# Patient Record
Sex: MALE
[De-identification: names, ages, dates, MRNs, and addresses within clinical notes are randomized per-mention and may not be internally consistent; named-entity substitution may affect disease eponyms.]

---

## 2021-01-28 ENCOUNTER — APPOINTMENT (OUTPATIENT)
Dept: ORTHOPEDIC SURGERY | Facility: CLINIC | Age: 61
End: 2021-01-28
Payer: COMMERCIAL

## 2021-01-28 VITALS
WEIGHT: 206 LBS | BODY MASS INDEX: 30.51 KG/M2 | SYSTOLIC BLOOD PRESSURE: 130 MMHG | HEART RATE: 86 BPM | DIASTOLIC BLOOD PRESSURE: 81 MMHG | HEIGHT: 69 IN

## 2021-01-28 DIAGNOSIS — Z78.9 OTHER SPECIFIED HEALTH STATUS: ICD-10-CM

## 2021-01-28 DIAGNOSIS — Z72.89 OTHER PROBLEMS RELATED TO LIFESTYLE: ICD-10-CM

## 2021-01-28 PROCEDURE — 73030 X-RAY EXAM OF SHOULDER: CPT | Mod: LT

## 2021-01-28 PROCEDURE — 20610 DRAIN/INJ JOINT/BURSA W/O US: CPT | Mod: LT

## 2021-01-28 PROCEDURE — 99204 OFFICE O/P NEW MOD 45 MIN: CPT | Mod: 25

## 2021-01-28 PROCEDURE — 99072 ADDL SUPL MATRL&STAF TM PHE: CPT

## 2021-01-28 RX ORDER — MELOXICAM 7.5 MG/1
7.5 TABLET ORAL
Qty: 30 | Refills: 0 | Status: ACTIVE | COMMUNITY
Start: 2021-01-28 | End: 1900-01-01

## 2021-02-03 NOTE — HISTORY OF PRESENT ILLNESS
[5] : a current pain level of 5/10 [1] : the ailment interference is 1/10 [(Does not interfere) 0] : the ailment interference is 0/10 (does not interfere) [4] : the ailment interference is 4/10 [de-identified] : Reese comes in today with complaints of pain to his left shoulder.  He has been training and practicing, getting ready for his golf season and he is having increasing complaints of pain to his shoulder.  This injury is not work related or due to an automobile accident.  The patient states the pain is localized.  The patient describes the pain as sharp.  The patient states lying down makes the symptoms worse.  [] : No

## 2021-02-03 NOTE — PROCEDURE
[de-identified] : Consent: \par At this time, I have recommended an injection to the left shoulder. The risks and benefits of the procedure were discussed with the patient in detail.  Upon verbal consent of the patient, we proceeded with the injection as noted below.  \par \par Procedure:  \par After a sterile prep, the patient underwent an injection of 9 cc of 1% Lidocaine without epinephrine and 1 cc of Kenalog into the left shoulder.  The patient tolerated the procedure well.  There were no complications.  \par \par

## 2021-02-03 NOTE — CONSULT LETTER
[Dear  ___] : Dear  [unfilled], [Consult Letter:] : I had the pleasure of evaluating your patient, [unfilled]. [Please see my note below.] : Please see my note below. [Consult Closing:] : Thank you very much for allowing me to participate in the care of this patient.  If you have any questions, please do not hesitate to contact me. [Sincerely,] : Sincerely, [FreeTextEntry3] : Miguel Collins III, MD \par NAHUM/dianne

## 2021-02-03 NOTE — DISCUSSION/SUMMARY
[de-identified] : At this time I recommend ice and elevation for the calcific tendinitis, left shoulder.  He will be reassessed in three or four weeks.

## 2021-02-03 NOTE — PHYSICAL EXAM
[de-identified] : Right Shoulder:\par Shoulder: Range of Motion in Degrees:   	\par    	                        Claimant:          Normal:  	\par Abduction (Active)  	180  	180 degrees  	\par Abduction (Passive)  	180  	180 degrees  	\par Forward elevation (Active):  	180  	180 degrees  	\par Forward elevation (Passive):  180  	180 degrees  	\par External rotation (Active):  	45  	45 degrees  	\par External rotation (Passive):  	45  	45 degrees  	\par Internal rotation (Active):  	L-1  	L-1  	\par Internal rotation (Passive):  	L-1  	L-1  	\par \par No motor weakness to internal rotation, external rotation or abduction in the scapular plane.  Negative crank test.  Negative O’Beau’s test.  Negative Speed’s test. Negative Yergason’s test.  Negative cross arm test.  No tenderness to palpation at the AC joint. Negative Hawkin’s sign.  Negative Neer’s sign.  Negative apprehension. Negative sulcus sign.  No gross neurological or vascular deficits distally.  Skin is intact.  No rashes, scars or lesions.  2+ radial and ulnar pulses. No extra-articular swelling or tenderness. \par \par Left Shoulder:  \par Shoulder: Range of Motion in Degrees:	\par 	                                  Claimant:	Normal:	\par Abduction (Active)	                  180	               180 degrees	\par Abduction (Passive)	  180	               180 degrees	\par Forward elevation (Active):	  180	               180 degrees	\par Forward elevation (Passive):	  180	               180 degrees	\par External rotation (Active):	   45	               45 degrees	\par External rotation (Passive):	   45	               45 degrees	\par Internal rotation (Active):	   L-1	               L-1	\par Internal rotation (Passive):	   L-1	               L-1	\par  \par No motor weakness to internal rotation, external rotation or abduction in the scapular plane.  Negative crank test.  Negative O’Beau’s test.  Negative Speed’s test. Negative Yergason’s test.  Negative cross arm test.  No tenderness to palpation at the AC joint. Positive Hawkin’s sign.  Positive Neer’s sign. Negative apprehension. Negative sulcus sign.  No gross neurological or vascular deficits distally.  Skin is intact.  No rashes, scars or lesions. 2+ radial and ulnar pulses. No extra-articular swelling or tenderness.\par   [de-identified] : Gait: normal    Station: normal  [de-identified] : Appearance: Well-developed, well-nourished male  in no acute distress.  [de-identified] : Radiographs, two views of the left shoulder, show calcific tendinitis.

## 2021-02-03 NOTE — ADDENDUM
[FreeTextEntry1] : This note was written by Natalie Nelson on 02/02/2021 acting as scribe for Miguel Collins III, MD

## 2021-02-18 ENCOUNTER — APPOINTMENT (OUTPATIENT)
Dept: ORTHOPEDIC SURGERY | Facility: CLINIC | Age: 61
End: 2021-02-18
Payer: COMMERCIAL

## 2021-02-18 DIAGNOSIS — M75.32 CALCIFIC TENDINITIS OF LEFT SHOULDER: ICD-10-CM

## 2021-02-18 PROCEDURE — 99441: CPT

## 2021-03-01 PROBLEM — M75.32 CALCIFIC TENDINITIS OF LEFT SHOULDER: Status: ACTIVE | Noted: 2021-01-28

## 2022-02-26 ENCOUNTER — NON-APPOINTMENT (OUTPATIENT)
Age: 62
End: 2022-02-26

## 2022-02-28 ENCOUNTER — APPOINTMENT (OUTPATIENT)
Dept: ORTHOPEDIC SURGERY | Facility: CLINIC | Age: 62
End: 2022-02-28
Payer: COMMERCIAL

## 2022-02-28 PROCEDURE — 99214 OFFICE O/P EST MOD 30 MIN: CPT

## 2022-02-28 PROCEDURE — 73630 X-RAY EXAM OF FOOT: CPT | Mod: LT

## 2022-02-28 NOTE — HISTORY OF PRESENT ILLNESS
[FreeTextEntry1] : The patient is a 61 year old male presenting for an initial evaluation of left great toe pain. The patient reports intermittent spontaneous pain to his great toe. He denies any pain to palpation today in office. He denies any pain while wearing un-supportive shoes, however, he does report pain with supportive shoes. He reports that pain is worsened with ambulating on uneven ground, while wearing cushioned shoes. He denies any past history of Gout or any significant medical history. He does have a familial history of Hallux Rigidus, with his daughter undergoing surgery in [place], [place]. He is wearing regular sneakers and is walking without assistance. No other complaints. \par  \par \par

## 2022-02-28 NOTE — ADDENDUM
[FreeTextEntry1] : I, Linda Christensen, acted solely as a scribe for Dr. Foreign Millard on this date 02/28/2022.\par \par All medical record entries made by the Scribe were at my, Dr. Foreign Millard, direction and personally dictated by me on 02/28/2022 . I have reviewed the chart and agree that the record accurately reflects my personal performance of the history, physical exam, assessment and plan. I have also personally directed, reviewed, and agreed with the chart.	\par

## 2022-02-28 NOTE — PHYSICAL EXAM
[de-identified] : General: Alert and oriented x3. In no acute distress. Pleasant in nature with a normal affect. No apparent respiratory distress.\par \par Left Foot Exam\par Skin: Clean, dry, intact\par Inspection: No obvious malalignment, no masses, no swelling, no effusion\par Pulses: 2+ DP/PT pulses\par ROM: FOOT Full  ROM of digits, ANKLE 10 degrees of dorsiflexion, 40 degrees of plantarflexion, 10 degrees of subtalar motion.\par Painful ROM: None\par Tenderness: + Tenderness to the dorsal joint. No pain with axial loading. No tenderness over the medial malleolus, No tenderness over the lateral malleolus, no CFL/ATFL/PTFL pain, no deltoid ligament pain. No heel pain. No Achilles tenderness. No 5th metatarsal pain. No pain to the LisFranc joint. No ttp over the posterior tibial tendon.\par Stability: Negative anterior/posterior drawer.\par Strength: 5/5 ADD/ABD/TA/GS/EHL/FHL/EDL\par Neuro: Sensation in tact to light touch throughout\par Additional tests: Negative Mortons test, negative tarsal tunnel tinels, negative single heel rise.	\par \par L Big Toe Exam\par ROM Great toe: 30 degrees of dorsiflexion with no pain, 30 degrees of plantarflexion with pain.		 [de-identified] : 3V of the left foot were ordered, obtained, and reviewed by me today, 02/28/2022, revealed: Small osteophytes at the 1st MTPJ. Hallux Rigidus. \par

## 2022-02-28 NOTE — DISCUSSION/SUMMARY
[de-identified] : Today I had a lengthy discussion with the patient regarding their left foot, Hallux Rigidus. I have addressed all the patient's concerns surrounding the pathology of their condition. XR imaging was completed in office today and results were reviewed with the patient. At this time I would like to obtain advanced imaging of the patient's left foot. An MRI was ordered so I can find out more about the etiology of the patient's condition and to evaluate the cartilage at the first MTPJ. The patient should follow up with the office after obtaining the MRI.	\par \par I recommend that the patient utilize meloxicam with food once per day as instructed. A prescription for the meloxicam was ordered for the patient in the office today. I recommend that the patient utilize ice, NSAIDs, and heat PRN. They can also elevate their left foot above the level of the heart. The patient understood and verbally agreed to the treatment plan. All of their questions were answered and they were satisfied with the visit. The patient should call the office if they have any questions or experience worsening symptoms.

## 2022-03-10 ENCOUNTER — APPOINTMENT (OUTPATIENT)
Dept: MRI IMAGING | Facility: CLINIC | Age: 62
End: 2022-03-10

## 2022-03-13 ENCOUNTER — TRANSCRIPTION ENCOUNTER (OUTPATIENT)
Age: 62
End: 2022-03-13

## 2022-04-07 ENCOUNTER — APPOINTMENT (OUTPATIENT)
Age: 62
End: 2022-04-07
Payer: COMMERCIAL

## 2022-04-07 PROCEDURE — 99214 OFFICE O/P EST MOD 30 MIN: CPT

## 2022-04-07 NOTE — PHYSICAL EXAM
[de-identified] : General: Alert and oriented x3. In no acute distress. Pleasant in nature with a normal affect. No apparent respiratory distress.\par \par Left Foot Exam\par Skin: Clean, dry, intact\par Inspection: No obvious malalignment, no masses, no swelling, no effusion\par Pulses: 2+ DP/PT pulses\par ROM: FOOT Full  ROM of digits, ANKLE 10 degrees of dorsiflexion, 40 degrees of plantarflexion, 10 degrees of subtalar motion.\par Painful ROM: None\par Tenderness: + Tenderness to the dorsal joint. No pain with axial loading. No tenderness over the medial malleolus, No tenderness over the lateral malleolus, no CFL/ATFL/PTFL pain, no deltoid ligament pain. No heel pain. No Achilles tenderness. No 5th metatarsal pain. No pain to the LisFranc joint. No ttp over the posterior tibial tendon.\par Stability: Negative anterior/posterior drawer.\par Strength: 5/5 ADD/ABD/TA/GS/EHL/FHL/EDL\par Neuro: Sensation in tact to light touch throughout\par Additional tests: Negative Mortons test, negative tarsal tunnel tinels, negative single heel rise.	\par \par L Big Toe Exam\par ROM Great toe: 30 degrees of dorsiflexion with no pain, 30 degrees of plantarflexion with pain.		 [de-identified] : MRI of the left foot done on 3/22/2022 at [facility] results reviewed 04/07/2022 , results as reported in the chart:\par \par Impressions:\par 1. 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 \par \par The images were unavailable. Report was reviewed with the patient and scanned into the chart.

## 2022-04-07 NOTE — HISTORY OF PRESENT ILLNESS
[FreeTextEntry1] : 4/7/2022: The patient returns to the office to review MRI results of the left foot. The patient reports that he utilized Meloxicam while on a 4 day golf trip with relief in symptoms noted. He does attend physical therapy and a HEP for his left foot. He is wearing dress shoes and is walking without assistance. No other complaints. \par \par 2/28/2022: The patient is a 61 year old male presenting for an initial evaluation of left great toe pain. The patient reports intermittent spontaneous pain to his great toe. He denies any pain to palpation today in office. He denies any pain while wearing un-supportive shoes, however, he does report pain with supportive shoes. He reports that pain is worsened with ambulating on uneven ground, while wearing cushioned shoes. He denies any past history of Gout or any significant medical history. He does have a familial history of Hallux Rigidus, with his daughter undergoing surgery in [place], [place]. He is wearing regular sneakers and is walking without assistance. No other complaints. \par  \par \par

## 2022-04-07 NOTE — DISCUSSION/SUMMARY
[de-identified] : Today I had a lengthy discussion with the patient regarding their left great toe pain, Hallux Rigidus. I have addressed all the patient's concerns surrounding the pathology of their condition. MRI results were reviewed with the patient today in the office. I recommend the patient undergo a course of physical therapy for the left great toe  2-3 times a week for a total of 6-8 weeks. A prescription was given for the physical therapy today. I recommend that the patient utilize a Gordon extension plate. The patient was educated about the Gordon extension plate in the office today.	\par \par Further, we discussed that if the patient would like to discuss surgical interventions, I advised him to bring the MRI CD with the images for further evaluation. The patient understood and verbally agreed to the treatment plan. All of their questions were answered and they were satisfied with the visit. The patient should call the office if they have any questions or experience worsening symptoms. I would like to see the patient back in the office in 2-3 months PRN to reassess their condition. 				\par

## 2022-04-07 NOTE — ADDENDUM
[FreeTextEntry1] : I, Linda Christensen, acted solely as a scribe for Dr. Foreign Millard on this date 04/07/2022.\par \par All medical record entries made by the Scribe were at my, Dr. Foreign Millard, direction and personally dictated by me on 04/07/2022 . I have reviewed the chart and agree that the record accurately reflects my personal performance of the history, physical exam, assessment and plan. I have also personally directed, reviewed, and agreed with the chart.	\par

## 2022-08-24 ENCOUNTER — NON-APPOINTMENT (OUTPATIENT)
Age: 62
End: 2022-08-24

## 2022-08-25 ENCOUNTER — APPOINTMENT (OUTPATIENT)
Dept: ORTHOPEDIC SURGERY | Facility: CLINIC | Age: 62
End: 2022-08-25

## 2022-08-25 PROCEDURE — 99213 OFFICE O/P EST LOW 20 MIN: CPT

## 2022-08-29 NOTE — DISCUSSION/SUMMARY
[de-identified] : At this time, due to SC arthritis and possible cervical spine complaints, I recommended rest, ice and topical anti-inflammatory cream for the SC joint.  He will be reassessed in 2-3 weeks.  Should his symptoms warrant, he may get an MRI and possible injection.  As far as the paraspinal issues, he is being referred to Dr. Lezama.

## 2022-08-29 NOTE — ADDENDUM
[FreeTextEntry1] : This note was written by Jonas Huerta on 08/29/2022, acting as a scribe for Miguel Collins III, MD

## 2022-08-29 NOTE — HISTORY OF PRESENT ILLNESS
[de-identified] : The patient comes in today for his right shoulder.  He states his left shoulder is feeling good, but he is having some pain in his right shoulder.  He states it comes and goes, especially when he is sitting at his desk.  He points to his SC joint, the right shoulder and up into the paraspinal musculature.  \par

## 2022-08-29 NOTE — PHYSICAL EXAM
[Normal] : Gait: normal [de-identified] : Left Shoulder: \par Shoulder: Range of Motion in Degrees:   	\par    	                        Claimant:  	Normal:  	\par Abduction (Active)  	180  	180 degrees  	\par Abduction (Passive)  	180  	180 degrees  	\par Forward elevation (Active):  	180  	180 degrees  	\par Forward elevation (Passive):  180  	180 degrees  	\par External rotation (Active):  	45  	45 degrees  	\par External rotation (Passive):  	45  	45 degrees  	\par Internal rotation (Active):  	L-1  	L-1  	\par Internal rotation (Passive):  	L-1  	L-1  	\par \par No motor weakness to internal rotation, external rotation or abduction in the scapular plane.  Negative crank test.  Negative O’Beau’s test.  Negative Speed’s test. Negative Yergason’s test.  Negative cross arm test.  No tenderness to palpation at the AC joint. Negative Hawkin’s sign.  Negative Neer’s sign.  Negative apprehension. Negative sulcus sign.  No gross neurological or vascular deficits distally.  Skin is intact.  No rashes, scars or lesions.  2+ radial and ulnar pulses. No extra-articular swelling or tenderness.\par  \par Right Shoulder: \par Shoulder: Range of Motion in Degrees:  	\par 	                        Claimant:	 Normal:	\par Abduction (Active)	                180	180 degrees	\par Abduction (Passive)	180	180 degrees	\par Forward elevation (Active):	180	180 degrees	\par Forward elevation (Passive):	180	180 degrees	\par External rotation (Active):	45	45 degrees	\par External rotation (Passive):	45	45 degrees	\par Internal rotation (Active):	L-1	L-1	\par Internal rotation (Passive):	L-1	L-1	\par \par Tenderness at the SC joint.  No motor weakness to internal rotation, external rotation or abduction in the scapular plane.  Negative crank test.  Negative O’Beau’s test.  Positive Speed’s test - Minimal.  Positive Yergason’s test - Minimal.  Negative cross arm test.  No tenderness to palpation at the AC joint. Negative Hawkin’s sign.  Negative Neer’s sign. Negative apprehension. Negative sulcus sign.  No gross neurological or vascular deficits distally.  Skin is intact.  No rashes, scars or lesions. 2+ radial and ulnar pulses. No extra-articular swelling or tenderness.\par   [de-identified] : Appearance:  Well developed, well-nourished male in no acute distress.\par

## 2022-08-29 NOTE — CONSULT LETTER
[Dear  ___] : Dear  [unfilled], [Referral Letter:] : I am referring [unfilled] to you for further evaluation.  My most recent evaluation follows. [Referral Closing:] : Thank you very much for seeing this patient.  If you have any questions, please do not hesitate to contact me. [Sincerely,] : Sincerely, [FreeTextEntry3] : Miguel Collins III, MD \par NAHUM/nelson\par

## 2022-09-14 ENCOUNTER — APPOINTMENT (OUTPATIENT)
Dept: ORTHOPEDIC SURGERY | Facility: CLINIC | Age: 62
End: 2022-09-14

## 2022-09-14 PROCEDURE — 99214 OFFICE O/P EST MOD 30 MIN: CPT

## 2022-09-14 PROCEDURE — 73630 X-RAY EXAM OF FOOT: CPT | Mod: LT

## 2022-09-14 NOTE — ADDENDUM
[FreeTextEntry1] : I, Linda Christensen, acted solely as a scribe for Dr. Foreign Millard on this date 09/14/2022.\par \par All medical record entries made by the Scribe were at my, Dr. Foreign Millard, direction and personally dictated by me on 09/14/2022. I have reviewed the chart and agree that the record accurately reflects my personal performance of the history, physical exam, assessment and plan. I have also personally directed, reviewed, and agreed with the chart.	\par

## 2022-09-14 NOTE — HISTORY OF PRESENT ILLNESS
[FreeTextEntry1] : 9/14/2022: The patient is a 62 year old male presenting for a follow-up evaluation of his left great toe pain. He presents today to discuss surgical interventions in the office. He denies any improvement in symptoms since his last evaluation, with increased pain and stiffness after periods of activity. He has tried a Gordon's extension plate for this. He is wearing flats and is walking without assistance. No other complaints. \par \par 4/7/2022: The patient returns to the office to review MRI results of the left foot. The patient reports that he utilized Meloxicam while on a 4 day golf trip with relief in symptoms noted. He does attend physical therapy and a HEP for his left foot. He is wearing dress shoes and is walking without assistance. No other complaints. \par \par 2/28/2022: The patient is a 61 year old male presenting for an initial evaluation of left great toe pain. The patient reports intermittent spontaneous pain to his great toe. He denies any pain to palpation today in office. He denies any pain while wearing un-supportive shoes, however, he does report pain with supportive shoes. He reports that pain is worsened with ambulating on uneven ground, while wearing cushioned shoes. He denies any past history of Gout or any significant medical history. He does have a familial history of Hallux Rigidus, with his daughter undergoing surgery in [place], [place]. He is wearing regular sneakers and is walking without assistance. No other complaints.

## 2022-09-14 NOTE — PHYSICAL EXAM
[de-identified] : General: Alert and oriented x3. In no acute distress. Pleasant in nature with a normal affect. No apparent respiratory distress.\par \par Left Foot Exam\par Skin: Clean, dry, intact\par Inspection: No obvious malalignment, no masses, no swelling, no effusion\par Pulses: 2+ DP/PT pulses\par ROM: FOOT Full ROM of digits, ANKLE 10 degrees of dorsiflexion, 40 degrees of plantarflexion, 10 degrees of subtalar motion.\par Painful ROM: None\par Tenderness: + Tenderness to the dorsal joint. No pain with axial loading. No tenderness over the medial malleolus, No tenderness over the lateral malleolus, no CFL/ATFL/PTFL pain, no deltoid ligament pain. No heel pain. No Achilles tenderness. No 5th metatarsal pain. No pain to the LisFranc joint. No ttp over the posterior tibial tendon.\par Stability: Negative anterior/posterior drawer.\par Strength: 5/5 ADD/ABD/TA/GS/EHL/FHL/EDL\par Neuro: Sensation in tact to light touch throughout\par Additional tests: Negative Mortons test, negative tarsal tunnel tinels, negative single heel rise.	\par \par L Big Toe Exam\par ROM Great toe: 30 degrees of dorsiflexion with no pain, 30 degrees of plantarflexion with pain.  [de-identified] : 3V of the left foot were ordered, obtained, and reviewed by me today, 09/14/2022, revealed: Hallux rigidus of the left foot.  \par \par MRI of the left foot done on 3/22/2022 at [facility] results reviewed 09/14/2022, results as reported in the chart:\par \par Impressions:\par 1. 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 \par

## 2022-09-14 NOTE — DISCUSSION/SUMMARY
[de-identified] : Today I had a lengthy discussion with the patient regarding their left great toe pain, Hallux Rigidus. I have addressed all the patient's concerns surrounding the pathology of their condition. MRI results were reviewed in the office. XR imaging was completed in office today and results were reviewed with the patient. We discussed both operative and non-operative treatment options in great detail. A lengthy discussion was had about the surgery. All risks, benefits and alternatives to the recommended surgical procedure were discussed which include but are not limited to bleeding, infection, nerve damage, vascular damage, failure of the wound to heal, the need for further surgery, loss of limb, DVT, PE, loss of life as well as the risks associated with general anesthesia. The patient verbalized understanding and will continue with conservative management at this time. 		\par \par A discussion was had about a possible cortisone injection for the left great toe. I also recommend that the patient utilize Voltaren gel topically. If the Voltaren gel could not be obtained, Icy Hot, Biofreeze, or Bengay can be utilized instead.			\par \par The patient understood and verbally agreed to the treatment plan. All of their questions were answered and they were satisfied with the visit. The patient should call the office if they have any questions or experience worsening symptoms. I would like to see the patient back in the office in PRN to reassess their condition. 	\par \par

## 2022-09-15 ENCOUNTER — APPOINTMENT (OUTPATIENT)
Dept: ORTHOPEDIC SURGERY | Facility: CLINIC | Age: 62
End: 2022-09-15

## 2022-09-15 DIAGNOSIS — M19.011 PRIMARY OSTEOARTHRITIS, RIGHT SHOULDER: ICD-10-CM

## 2022-09-15 PROCEDURE — 99441: CPT

## 2022-09-26 ENCOUNTER — APPOINTMENT (OUTPATIENT)
Dept: ORTHOPEDIC SURGERY | Facility: CLINIC | Age: 62
End: 2022-09-26

## 2022-09-26 DIAGNOSIS — M79.675 PAIN IN LEFT TOE(S): ICD-10-CM

## 2022-09-26 DIAGNOSIS — M20.22 HALLUX RIGIDUS, LEFT FOOT: ICD-10-CM

## 2022-09-26 PROCEDURE — 99213 OFFICE O/P EST LOW 20 MIN: CPT | Mod: 25

## 2022-09-26 PROCEDURE — 20605 DRAIN/INJ JOINT/BURSA W/O US: CPT | Mod: LT

## 2022-09-26 NOTE — ADDENDUM
[FreeTextEntry1] : I, Linda Christensen, acted solely as a scribe for Dr. Foreign Millard on this date 09/26/2022.\par \par All medical record entries made by the Scribe were at my, Dr. Foreign Millard, direction and personally dictated by me on 09/26/2022. I have reviewed the chart and agree that the record accurately reflects my personal performance of the history, physical exam, assessment and plan. I have also personally directed, reviewed, and agreed with the chart.	\par

## 2022-09-26 NOTE — DISCUSSION/SUMMARY
[de-identified] : Assessment: Left great toe hallux rigidus.\par \par Plan:\par 1. RICE Therapy.\par 2. Antiinflammatories/Tylenol as needed for pain of discomfort. \par 3. The patient was advised to rest the great toe for 24-48 hours post injection.  The patient is able to resume normal activities in 24-48 hours post injection if the patient is experiencing minimal to no pain. \par 4. Continue with a home exercise/stretching program. \par 5. All the patients questions were answered.  If the patient is experiencing any problems or has concerns they were advised to call the office or make an appointment to come in to be evaluated.\par

## 2022-09-26 NOTE — HISTORY OF PRESENT ILLNESS
[FreeTextEntry1] : 9/26/22: The patient is here with continued pain left great toe from hallux rigidus.  He is seeking a cortisone injection for pain relief.  He went golfing yesterday and has some discomfort.  He is wearing regular sneakers walking without assistance.  No other complaints.\par \par 9/14/2022: The patient is a 62 year old male presenting for a follow-up evaluation of his left great toe pain. He presents today to discuss surgical interventions in the office. He denies any improvement in symptoms since his last evaluation, with increased pain and stiffness after periods of activity. He has tried a Gordon's extension plate for this. He is wearing flats and is walking without assistance. No other complaints.

## 2022-09-26 NOTE — PROCEDURE
[FreeTextEntry1] : Laterality: Left foot first MTP joint.\par \par The risks and benefits of the injection were reviewed with the patient today in office and all their questions were answered.  The injection site was the first MTP joint left foot.  Prior to giving the injection the injection site was prepped with Chloraprep and a sterile field was created.  Sterile technique was carried out throughout the procedure.  After verbal consent from the patient we went ahead and injected the right first MTP joint today with 1 ml 40 mg Depo-Medrol, 1 ml 1% lidocaine and 1 ml of .50% Bupivacaine for a total of 3 ml with a 25 brian 1.5" needle.  The medication was placed into the joint without complication.  Post injection the patient reported no pain, had a normal gait and good motion of the great toe at the first MTP joint.  The patient denied numbness and tingling in their foot.  There were no complications during the procedure.

## 2022-09-26 NOTE — PHYSICAL EXAM
[de-identified] : General: Alert and oriented x3. In no acute distress. Pleasant in nature with a normal affect. No apparent respiratory distress.\par \par Left Foot Exam\par Skin: Clean, dry, intact\par Inspection: No obvious malalignment, no masses, no swelling, no effusion\par Pulses: 2+ DP/PT pulses\par ROM: FOOT Full ROM of digits, ANKLE 10 degrees of dorsiflexion, 40 degrees of plantarflexion, 10 degrees of subtalar motion.\par Painful ROM: None\par Tenderness: + Tenderness to the dorsal joint first MTPJ. No pain with axial loading. No tenderness over the medial malleolus, No tenderness over the lateral malleolus, no CFL/ATFL/PTFL pain, no deltoid ligament pain. No heel pain. No Achilles tenderness. No 5th metatarsal pain. No pain to the LisFranc joint. No ttp over the posterior tibial tendon.\par Stability: Negative anterior/posterior drawer.\par Strength: 5/5 ADD/ABD/TA/GS/EHL/FHL/EDL\par Neuro: Sensation in tact to light touch throughout\par Additional tests: Negative Mortons test, negative tarsal tunnel tinels, negative single heel rise.	\par \par L Big Toe Exam\par ROM Great toe: 30 degrees of dorsiflexion with no pain, 30 degrees of plantarflexion with pain.  [de-identified] : No new imaging was obtained.

## 2022-10-03 PROBLEM — M19.011 ARTHRITIS OF RIGHT STERNOCLAVICULAR JOINT: Status: ACTIVE | Noted: 2022-08-25

## 2022-12-28 ENCOUNTER — APPOINTMENT (OUTPATIENT)
Dept: ORTHOPEDIC SURGERY | Facility: CLINIC | Age: 62
End: 2022-12-28
Payer: COMMERCIAL

## 2022-12-28 VITALS
HEIGHT: 69 IN | SYSTOLIC BLOOD PRESSURE: 115 MMHG | WEIGHT: 208 LBS | HEART RATE: 94 BPM | BODY MASS INDEX: 30.81 KG/M2 | DIASTOLIC BLOOD PRESSURE: 75 MMHG

## 2022-12-28 DIAGNOSIS — M75.22 BICIPITAL TENDINITIS, LEFT SHOULDER: ICD-10-CM

## 2022-12-28 PROCEDURE — 73030 X-RAY EXAM OF SHOULDER: CPT | Mod: LT

## 2022-12-28 PROCEDURE — 20610 DRAIN/INJ JOINT/BURSA W/O US: CPT | Mod: LT

## 2023-01-04 VITALS
BODY MASS INDEX: 30.81 KG/M2 | HEIGHT: 69 IN | DIASTOLIC BLOOD PRESSURE: 75 MMHG | SYSTOLIC BLOOD PRESSURE: 115 MMHG | WEIGHT: 208 LBS

## 2023-01-04 PROBLEM — M75.22 BICEPS TENDINITIS OF LEFT UPPER EXTREMITY: Status: ACTIVE | Noted: 2022-12-29

## 2023-01-04 NOTE — ADDENDUM
[FreeTextEntry1] : This note was written by Jonas Huerta on 01/04/2023, acting as a scribe for Miguel Collins III, MD

## 2023-01-04 NOTE — PHYSICAL EXAM
[Normal] : Gait: normal [de-identified] : Right Shoulder: \par Shoulder: Range of Motion in Degrees:   	\par    	                        Claimant:          Normal:  	\par Abduction (Active)  	180  	180 degrees  	\par Abduction (Passive)  	180  	180 degrees  	\par Forward elevation (Active):  	180  	180 degrees  	\par Forward elevation (Passive):  180  	180 degrees  	\par External rotation (Active):  	45  	45 degrees  	\par External rotation (Passive):  	45  	45 degrees  	\par Internal rotation (Active):  	L-1  	L-1  	\par Internal rotation (Passive):  	L-1  	L-1  \par 	\par No motor weakness to internal rotation, external rotation or abduction in the scapular plane.  Negative crank test.  Negative O’Beau’s test.  Negative Speed’s test. Negative Yergason’s test.  Negative cross arm test.  No tenderness to palpation at the AC joint. Negative Hawkin’s sign.  Negative Neer’s sign.  Negative apprehension. Negative sulcus sign.  No gross neurological or vascular deficits distally.  Skin is intact.  No rashes, scars or lesions.  2+ radial and ulnar pulses. No extra-articular swelling or tenderness. \par \par Left Shoulder: \par Shoulder: Range of Motion in Degrees:  	\par 	                        Claimant:	 Normal:	\par Abduction (Active)	                180	180 degrees	\par Abduction (Passive)	180	180 degrees	\par Forward elevation (Active):	180	180 degrees	\par Forward elevation (Passive):	180	180 degrees	\par External rotation (Active):	45	45 degrees	\par External rotation (Passive):	45	45 degrees	\par Internal rotation (Active):	L-1	L-1	\par Internal rotation (Passive):	L-1	L-1\par 	\par No motor weakness to internal rotation, external rotation or abduction in the scapular plane.  Positive crank test.  Positive O’Beau’s test.  Positive Speed’s test.  Positive Yergason’s test.  Negative cross arm test.  No tenderness to palpation at the AC joint. Negative Hawkin’s sign.  Negative Neer’s sign. Negative apprehension. Negative sulcus sign.  No gross neurological or vascular deficits distally.  Skin is intact.  No rashes, scars or lesions. 2+ radial and ulnar pulses. No extra-articular swelling or tenderness. \par  [de-identified] : Appearance:  Well developed, well-nourished male in no acute distress.\par   [de-identified] : Radiographs, two views of the left shoulder taken in the office today, show no obvious osseous abnormalities.\par

## 2023-01-04 NOTE — HISTORY OF PRESENT ILLNESS
[de-identified] : The patient comes in today with some increasing complaints of pain to his left shoulder.  He has not been seen in a while.  \par

## 2023-01-04 NOTE — DISCUSSION/SUMMARY
[de-identified] : At this time, I recommended ice and elevation for the left shoulder biceps tendinitis and SLAP lesion.  He will be reassessed in 3-4 weeks.\par

## 2023-03-07 ENCOUNTER — RX RENEWAL (OUTPATIENT)
Age: 63
End: 2023-03-07

## 2023-03-07 RX ORDER — MELOXICAM 15 MG/1
15 TABLET ORAL
Qty: 30 | Refills: 1 | Status: ACTIVE | COMMUNITY
Start: 2022-02-28 | End: 1900-01-01

## 2023-05-22 ENCOUNTER — APPOINTMENT (OUTPATIENT)
Dept: ORTHOPEDIC SURGERY | Facility: CLINIC | Age: 63
End: 2023-05-22
Payer: COMMERCIAL

## 2023-05-22 VITALS
HEART RATE: 75 BPM | WEIGHT: 205 LBS | BODY MASS INDEX: 30.36 KG/M2 | SYSTOLIC BLOOD PRESSURE: 126 MMHG | DIASTOLIC BLOOD PRESSURE: 77 MMHG | HEIGHT: 69 IN

## 2023-05-22 DIAGNOSIS — M75.42 IMPINGEMENT SYNDROME OF LEFT SHOULDER: ICD-10-CM

## 2023-05-22 DIAGNOSIS — S43.432A SUPERIOR GLENOID LABRUM LESION OF LEFT SHOULDER, INITIAL ENCOUNTER: ICD-10-CM

## 2023-05-22 PROCEDURE — 73560 X-RAY EXAM OF KNEE 1 OR 2: CPT | Mod: LT

## 2023-05-22 PROCEDURE — 20610 DRAIN/INJ JOINT/BURSA W/O US: CPT | Mod: LT

## 2023-05-23 PROBLEM — M75.42 IMPINGEMENT SYNDROME OF LEFT SHOULDER: Status: ACTIVE | Noted: 2023-05-23

## 2023-05-23 PROBLEM — S43.432A SUPERIOR GLENOID LABRUM LESION OF LEFT SHOULDER, INITIAL ENCOUNTER: Status: ACTIVE | Noted: 2022-12-29

## 2023-05-24 NOTE — DISCUSSION/SUMMARY
[de-identified] : The patient was given an intraarticular subacromial injection as noted above for impingement/SLAP, left shoulder.  At this time I recommend ice and elevation.  He will be reassessed in three or four weeks.

## 2023-05-24 NOTE — PHYSICAL EXAM
[de-identified] : Left shoulder:\par Shoulder: Range of Motion in Degrees:	\par 	                                  Claimant:	Normal:	\par Abduction (Active)	                  180	               180 degrees	\par Abduction (Passive)	  180	               180 degrees	\par Forward elevation (Active):	  180	               180 degrees	\par Forward elevation (Passive):	  180	               180 degrees	\par External rotation (Active):	   45	               45 degrees	\par External rotation (Passive):	   45	               45 degrees	\par Internal rotation (Active):	   L-1	               L-1	\par Internal rotation (Passive):	   L-1	               L-1	\par  \par No motor weakness to internal rotation, external rotation or abduction in the scapular plane. Positive crank test.  Positive O’Beau’s test.  Negative Speed’s test. Negative Yergason’s test.  Negative cross arm test.  No tenderness to palpation at the AC joint. Positive Hawkin’s sign.  Positive Neer’s sign. Negative apprehension. Negative sulcus sign.  No gross neurological or vascular deficits distally.  Skin is intact.  No rashes, scars or lesions. 2+ radial and ulnar pulses. No extra-articular swelling or tenderness.\par   [de-identified] : Gait: Normal    Station: Normal  [de-identified] : Appearance: Well-developed, well-nourished male  in no acute distress.  [de-identified] : Radiographs taken in the office today, two views of the left shoulder, show no interval change.

## 2023-05-24 NOTE — PROCEDURE
[de-identified] : Consent: \par At this time, I have recommended an intraarticular subacromial injection to the left shoulder.  The risks and benefits of the procedure were discussed with the patient in detail.  Upon verbal consent of the patient, we proceeded with the injection as noted below.  \par \par Indications: Impingement, left shoulder\par                    SLAP tear, left shoulder\par : [employer]par Drug name: Triamcinolone Acetonide Injectable Suspension USP\par NDC#: 0143-9577-10\par Lot#: [id number]\par Expiration date: 01/24\par \par Procedure:\par After a sterile prep, the patient underwent an intraarticular subacromial injection of 9 cc of 1% Lidocaine (10mg/mL) without epinephrine and 1 cc of Kenalog (40 mg/mL) into the left shoulder.  The patient tolerated the procedure well.  There were no complications.  \par \par

## 2023-05-24 NOTE — HISTORY OF PRESENT ILLNESS
[de-identified] : eRese comes in today.  He has not been seen since December.  He has some increasing complaints of pain to his left shoulder.  He has been doing a lot of working out.

## 2023-05-24 NOTE — ADDENDUM
[FreeTextEntry1] : This note was written by Natalie Nelson on 05/24/2023 acting as scribe for Miguel Collins III, MD